# Patient Record
Sex: MALE | Race: WHITE
[De-identification: names, ages, dates, MRNs, and addresses within clinical notes are randomized per-mention and may not be internally consistent; named-entity substitution may affect disease eponyms.]

---

## 2019-09-03 ENCOUNTER — HOSPITAL ENCOUNTER (OUTPATIENT)
Dept: HOSPITAL 92 - BICRAD | Age: 49
Discharge: HOME | End: 2019-09-03
Attending: PHYSICIAN ASSISTANT
Payer: COMMERCIAL

## 2019-09-03 DIAGNOSIS — R05: Primary | ICD-10-CM

## 2019-09-03 PROCEDURE — 71046 X-RAY EXAM CHEST 2 VIEWS: CPT

## 2019-09-03 NOTE — RAD
PA AND LATERAL VIEWS CHEST:

 

Date:  09/03/19 

 

HISTORY:  

Cough, fever. 

 

FINDINGS:

 

Comparison made with exam of 03/23/16. 

 

The heart size is normal. The aorta is tortuous. The lungs are well expanded without focal areas of c
onsolidation, pneumothoraces, or pleural effusions. No acute osseous abnormalities are seen. 

 

IMPRESSION: 

No acute process. 

 

 

 

POS: [facility]

## 2024-08-19 ENCOUNTER — HOSPITAL ENCOUNTER (EMERGENCY)
Dept: HOSPITAL 92 - CSHERS | Age: 54
Discharge: HOME | End: 2024-08-19
Payer: COMMERCIAL

## 2024-08-19 DIAGNOSIS — R42: Primary | ICD-10-CM

## 2024-08-19 DIAGNOSIS — N17.9: ICD-10-CM

## 2024-08-19 DIAGNOSIS — Z79.899: ICD-10-CM

## 2024-08-19 DIAGNOSIS — I10: ICD-10-CM

## 2024-08-19 LAB
ALBUMIN SERPL BCG-MCNC: 4.1 G/DL (ref 3.5–5)
ALP SERPL-CCNC: 56 U/L (ref 40–110)
ALT SERPL W P-5'-P-CCNC: 29 U/L (ref 8–55)
ANION GAP SERPL CALC-SCNC: 12 MMOL/L (ref 10–20)
AST SERPL-CCNC: 20 U/L (ref 5–34)
BASOPHILS # BLD AUTO: 0.05 10X3/UL (ref 0–0.2)
BASOPHILS NFR BLD AUTO: 0.4 % (ref 0–2)
BILIRUB SERPL-MCNC: 1 MG/DL (ref 0.2–1.2)
BUN SERPL-MCNC: 31 MG/DL (ref 8.4–25.7)
CALCIUM SERPL-MCNC: 9.3 MG/DL (ref 7.8–10.44)
CHLORIDE SERPL-SCNC: 108 MMOL/L (ref 98–107)
CO2 SERPL-SCNC: 24 MMOL/L (ref 22–29)
CREAT CL PREDICTED SERPL C-G-VRATE: 0 ML/MIN (ref 70–130)
EOSINOPHIL # BLD AUTO: 0.02 10X3/UL (ref 0–0.5)
EOSINOPHIL NFR BLD AUTO: 0.1 % (ref 0–6)
GLOBULIN SER CALC-MCNC: (no result) G/DL (ref 2.4–3.5)
GLUCOSE SERPL-MCNC: 80 MG/DL (ref 70–105)
HCT VFR BLD CALC: 44.9 % (ref 38.8–50)
HGB BLD-MCNC: 15.3 G/DL (ref 13.5–17.5)
LYMPHOCYTES NFR BLD AUTO: 8.4 % (ref 18–47)
MCH RBC QN AUTO: 30 PG (ref 27–33)
MCV RBC AUTO: 88 FL (ref 81.2–95.1)
MONOCYTES # BLD AUTO: 0.98 10X3/UL (ref 0–1.1)
MONOCYTES NFR BLD AUTO: 7.1 % (ref 0–10)
NEUTROPHILS # BLD AUTO: 11.48 10X3/UL (ref 1.5–8.4)
NEUTROPHILS NFR BLD AUTO: 83.6 % (ref 40–75)
PLATELET # BLD AUTO: 220 10X3/UL (ref 150–450)
POTASSIUM SERPL-SCNC: 4.1 MMOL/L (ref 3.5–5.1)
RBC # BLD AUTO: 5.1 10X6/UL (ref 4.32–5.72)
SODIUM SERPL-SCNC: 140 MMOL/L (ref 136–145)
TROPONIN I SERPL DL<=0.01 NG/ML-MCNC: (no result) NG/ML (ref ?–0.03)
WBC # BLD AUTO: 13.7 10X3/UL (ref 3.5–10.5)

## 2024-08-19 PROCEDURE — 85025 COMPLETE CBC W/AUTO DIFF WBC: CPT

## 2024-08-19 PROCEDURE — 36415 COLL VENOUS BLD VENIPUNCTURE: CPT

## 2024-08-19 PROCEDURE — 84484 ASSAY OF TROPONIN QUANT: CPT

## 2024-08-19 PROCEDURE — 93005 ELECTROCARDIOGRAM TRACING: CPT

## 2024-08-19 PROCEDURE — 80053 COMPREHEN METABOLIC PANEL: CPT
